# Patient Record
Sex: FEMALE | Race: BLACK OR AFRICAN AMERICAN | Employment: UNEMPLOYED | URBAN - METROPOLITAN AREA
[De-identification: names, ages, dates, MRNs, and addresses within clinical notes are randomized per-mention and may not be internally consistent; named-entity substitution may affect disease eponyms.]

---
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
## 2023-07-25 NOTE — PROGRESS NOTES
Elin Braga received a viral test for COVID-19. They were educated on isolation and quarantine as appropriate. For any symptoms, they were directed to seek care from their PCP, given contact information to establish with a doctor, directed to an urgent care or the emergency room. IMPRESSION      1.  Strep pharyngitis          DISPOSITION/PLAN   DISPOSITION Decision To Discharge 07/25/2023 06:28:16 PM      OUTPATIENT FOLLOW UP THE PATIENT:  Galilea Cantu Lior  [facility] [address]  [address] [facility]  [address] [facility] [address]  [facility], [address] [place] [address]  07/25/23 0184

## 2023-10-13 ENCOUNTER — OFFICE VISIT (OUTPATIENT)
Age: 23
End: 2023-10-13

## 2023-10-13 VITALS
HEIGHT: 67 IN | WEIGHT: 201.5 LBS | HEART RATE: 110 BPM | TEMPERATURE: 98.8 F | OXYGEN SATURATION: 98 % | SYSTOLIC BLOOD PRESSURE: 118 MMHG | RESPIRATION RATE: 18 BRPM | BODY MASS INDEX: 31.63 KG/M2 | DIASTOLIC BLOOD PRESSURE: 80 MMHG

## 2023-10-13 DIAGNOSIS — R11.0 NAUSEA: Primary | ICD-10-CM

## 2023-10-13 DIAGNOSIS — R10.9 ACUTE ABDOMINAL PAIN: ICD-10-CM

## 2023-10-13 LAB
APPEARANCE FLUID: CLEAR
BILIRUBIN, POC: NORMAL
BLOOD URINE, POC: NORMAL
CLARITY, POC: CLEAR
COLOR, POC: YELLOW
CONTROL: NORMAL
GLUCOSE URINE, POC: NEGATIVE
KETONES, POC: NORMAL
LEUKOCYTE EST, POC: NEGATIVE
NITRITE, POC: NEGATIVE
PH, POC: 5.5
PREGNANCY TEST URINE, POC: NEGATIVE
PROTEIN, POC: NEGATIVE
SPECIFIC GRAVITY, POC: 1.03
UROBILINOGEN, POC: NEGATIVE

## 2023-10-15 SDOH — HEALTH STABILITY: PHYSICAL HEALTH: ON AVERAGE, HOW MANY DAYS PER WEEK DO YOU ENGAGE IN MODERATE TO STRENUOUS EXERCISE (LIKE A BRISK WALK)?: 1 DAY

## 2023-10-15 SDOH — HEALTH STABILITY: PHYSICAL HEALTH: ON AVERAGE, HOW MANY MINUTES DO YOU ENGAGE IN EXERCISE AT THIS LEVEL?: 30 MIN

## 2023-10-16 ENCOUNTER — OFFICE VISIT (OUTPATIENT)
Dept: PRIMARY CARE CLINIC | Age: 23
End: 2023-10-16
Payer: COMMERCIAL

## 2023-10-16 VITALS
OXYGEN SATURATION: 98 % | WEIGHT: 198 LBS | SYSTOLIC BLOOD PRESSURE: 118 MMHG | HEIGHT: 67 IN | DIASTOLIC BLOOD PRESSURE: 70 MMHG | HEART RATE: 98 BPM | BODY MASS INDEX: 31.08 KG/M2

## 2023-10-16 DIAGNOSIS — R50.9 INTERMITTENT FEVER OF UNKNOWN ORIGIN: ICD-10-CM

## 2023-10-16 DIAGNOSIS — Z23 FLU VACCINE NEED: ICD-10-CM

## 2023-10-16 DIAGNOSIS — R76.8 ELEVATED RHEUMATOID FACTOR: Primary | ICD-10-CM

## 2023-10-16 DIAGNOSIS — Z13.21 ENCOUNTER FOR VITAMIN DEFICIENCY SCREENING: ICD-10-CM

## 2023-10-16 PROCEDURE — 99204 OFFICE O/P NEW MOD 45 MIN: CPT | Performed by: NURSE PRACTITIONER

## 2023-10-16 PROCEDURE — 90674 CCIIV4 VAC NO PRSV 0.5 ML IM: CPT | Performed by: NURSE PRACTITIONER

## 2023-10-16 PROCEDURE — G8427 DOCREV CUR MEDS BY ELIG CLIN: HCPCS | Performed by: NURSE PRACTITIONER

## 2023-10-16 PROCEDURE — G8417 CALC BMI ABV UP PARAM F/U: HCPCS | Performed by: NURSE PRACTITIONER

## 2023-10-16 PROCEDURE — 1036F TOBACCO NON-USER: CPT | Performed by: NURSE PRACTITIONER

## 2023-10-16 PROCEDURE — G8482 FLU IMMUNIZE ORDER/ADMIN: HCPCS | Performed by: NURSE PRACTITIONER

## 2023-10-16 PROCEDURE — 90471 IMMUNIZATION ADMIN: CPT | Performed by: NURSE PRACTITIONER

## 2023-10-16 SDOH — ECONOMIC STABILITY: FOOD INSECURITY: WITHIN THE PAST 12 MONTHS, YOU WORRIED THAT YOUR FOOD WOULD RUN OUT BEFORE YOU GOT MONEY TO BUY MORE.: NEVER TRUE

## 2023-10-16 SDOH — ECONOMIC STABILITY: FOOD INSECURITY: WITHIN THE PAST 12 MONTHS, THE FOOD YOU BOUGHT JUST DIDN'T LAST AND YOU DIDN'T HAVE MONEY TO GET MORE.: NEVER TRUE

## 2023-10-16 SDOH — ECONOMIC STABILITY: INCOME INSECURITY: HOW HARD IS IT FOR YOU TO PAY FOR THE VERY BASICS LIKE FOOD, HOUSING, MEDICAL CARE, AND HEATING?: NOT VERY HARD

## 2023-10-16 SDOH — ECONOMIC STABILITY: HOUSING INSECURITY
IN THE LAST 12 MONTHS, WAS THERE A TIME WHEN YOU DID NOT HAVE A STEADY PLACE TO SLEEP OR SLEPT IN A SHELTER (INCLUDING NOW)?: NO

## 2023-10-16 ASSESSMENT — PATIENT HEALTH QUESTIONNAIRE - PHQ9
SUM OF ALL RESPONSES TO PHQ QUESTIONS 1-9: 0
2. FEELING DOWN, DEPRESSED OR HOPELESS: 0
SUM OF ALL RESPONSES TO PHQ9 QUESTIONS 1 & 2: 0
SUM OF ALL RESPONSES TO PHQ QUESTIONS 1-9: 0
SUM OF ALL RESPONSES TO PHQ QUESTIONS 1-9: 0
1. LITTLE INTEREST OR PLEASURE IN DOING THINGS: 0
SUM OF ALL RESPONSES TO PHQ QUESTIONS 1-9: 0

## 2023-10-16 ASSESSMENT — ENCOUNTER SYMPTOMS
GASTROINTESTINAL NEGATIVE: 1
SHORTNESS OF BREATH: 0
CHEST TIGHTNESS: 0
COUGH: 0
APNEA: 0
WHEEZING: 0

## 2023-11-16 ENCOUNTER — TELEMEDICINE (OUTPATIENT)
Dept: PRIMARY CARE CLINIC | Age: 23
End: 2023-11-16

## 2023-11-16 DIAGNOSIS — J06.9 VIRAL UPPER RESPIRATORY INFECTION: ICD-10-CM

## 2023-11-16 DIAGNOSIS — Z20.822 SUSPECTED COVID-19 VIRUS INFECTION: ICD-10-CM

## 2023-11-16 DIAGNOSIS — J02.9 SORE THROAT: Primary | ICD-10-CM

## 2023-11-16 RX ORDER — IBUPROFEN 200 MG
2 TABLET ORAL EVERY 6 HOURS PRN
COMMUNITY

## 2023-11-16 RX ORDER — ACETAMINOPHEN 500 MG
1000 TABLET ORAL 3 TIMES DAILY PRN
COMMUNITY

## 2023-11-16 RX ORDER — BROMPHENIRAMINE MALEATE, PSEUDOEPHEDRINE HYDROCHLORIDE, AND DEXTROMETHORPHAN HYDROBROMIDE 2; 30; 10 MG/5ML; MG/5ML; MG/5ML
5 SYRUP ORAL 4 TIMES DAILY PRN
Qty: 200 ML | Refills: 0 | Status: SHIPPED | OUTPATIENT
Start: 2023-11-16 | End: 2023-11-26

## 2023-11-16 ASSESSMENT — ENCOUNTER SYMPTOMS
CHEST TIGHTNESS: 0
RHINORRHEA: 1
SORE THROAT: 1
SHORTNESS OF BREATH: 0
WHEEZING: 0
SINUS PAIN: 0
COUGH: 1
SINUS PRESSURE: 0

## 2023-11-16 NOTE — PROGRESS NOTES
ASSESSMENT/PLAN:  1. Sore throat  - Acute  - POCT rapid strep A    2. Viral upper respiratory infection  - Acute  - Start brompheniramine-pseudoephedrine-DM 2-30-10 MG/5ML syrup; Take 5 mLs by mouth 4 times daily as needed for Congestion or Cough  Dispense: 200 mL; Refill: 0  - Continue alternating Tylenol and Motrin for fever and headache. 3. Suspected COVID-19 virus infection  - COVID-19; Future      Return if symptoms worsen or fail to improve. RAPID STREP TEST & COVID TEST TODAY. .    Current Outpatient Medications   Medication Sig Dispense Refill    acetaminophen (TYLENOL) 500 MG tablet Take 2 tablets by mouth 3 times daily as needed for Fever or Headaches      ibuprofen (ADVIL;MOTRIN) 200 MG tablet Take 2 tablets by mouth every 6 hours as needed for Fever or Headaches      brompheniramine-pseudoephedrine-DM 2-30-10 MG/5ML syrup Take 5 mLs by mouth 4 times daily as needed for Congestion or Cough 200 mL 0     No current facility-administered medications for this visit. Moshe Avilez, was evaluated through a synchronous (real-time) audio-video encounter. The patient (or guardian if applicable) is aware that this is a billable service, which includes applicable co-pays. This Virtual Visit was conducted with patient's (and/or legal guardian's) consent. Patient identification was verified, and a caregiver was present when appropriate. The patient was located at Home: [address] [place] [address]  Provider was located at [facility] ([address] ): [address] [place] [address],  [address] [place] [address]       Total time spent for this encounter:  15 minutes    --MAIK Huynh CNP on 11/16/2023 at 11:20 AM    An electronic signature was used to authenticate this note. This dictation was generated by voice recognition computer software. Although all attempts are made to edit the dictation for accuracy, there may be errors in the transcription that are not intended.

## 2024-01-31 ENCOUNTER — TELEPHONE (OUTPATIENT)
Dept: PRIMARY CARE CLINIC | Age: 24
End: 2024-01-31

## 2024-02-01 ENCOUNTER — TELEMEDICINE (OUTPATIENT)
Dept: PRIMARY CARE CLINIC | Age: 24
End: 2024-02-01
Payer: COMMERCIAL

## 2024-02-01 DIAGNOSIS — U07.1 COVID-19 VIRUS INFECTION: Primary | ICD-10-CM

## 2024-02-01 DIAGNOSIS — R11.0 NAUSEA: ICD-10-CM

## 2024-02-01 PROCEDURE — G8427 DOCREV CUR MEDS BY ELIG CLIN: HCPCS | Performed by: NURSE PRACTITIONER

## 2024-02-01 PROCEDURE — 99214 OFFICE O/P EST MOD 30 MIN: CPT | Performed by: NURSE PRACTITIONER

## 2024-02-01 RX ORDER — AZELASTINE HYDROCHLORIDE 137 UG/1
2 SPRAY, METERED NASAL DAILY
COMMUNITY
Start: 2023-10-09

## 2024-02-01 RX ORDER — ONDANSETRON 4 MG/1
4 TABLET, FILM COATED ORAL 3 TIMES DAILY PRN
Qty: 15 TABLET | Refills: 0 | Status: SHIPPED | OUTPATIENT
Start: 2024-02-01

## 2024-02-01 RX ORDER — ACETAMINOPHEN, GUAIFENESIN 325; 200 MG/1; MG/1
2 CAPSULE, LIQUID FILLED ORAL EVERY 6 HOURS PRN
COMMUNITY

## 2024-02-01 ASSESSMENT — ENCOUNTER SYMPTOMS
SORE THROAT: 1
VOMITING: 0
NAUSEA: 1
SINUS PAIN: 0
TROUBLE SWALLOWING: 0
RHINORRHEA: 1
SHORTNESS OF BREATH: 0
WHEEZING: 0
SINUS PRESSURE: 0
CHEST TIGHTNESS: 0
COUGH: 1
DIARRHEA: 0
APNEA: 0

## 2024-02-01 ASSESSMENT — PATIENT HEALTH QUESTIONNAIRE - PHQ9
SUM OF ALL RESPONSES TO PHQ QUESTIONS 1-9: 0
SUM OF ALL RESPONSES TO PHQ9 QUESTIONS 1 & 2: 0
2. FEELING DOWN, DEPRESSED OR HOPELESS: 0
SUM OF ALL RESPONSES TO PHQ QUESTIONS 1-9: 0
1. LITTLE INTEREST OR PLEASURE IN DOING THINGS: 0
SUM OF ALL RESPONSES TO PHQ QUESTIONS 1-9: 0
SUM OF ALL RESPONSES TO PHQ QUESTIONS 1-9: 0

## 2024-02-01 NOTE — PATIENT INSTRUCTIONS
During your illness stay well hydrated with water, Pedialyte, or sugar-free Gatorade. Take Tylenol for body aches, fever, headache as needed. Seek emergency medical care immediately for trouble breathing, persistent pain or pressure in the chest, new confusion, inability to wake or stay awake, or bluish lips or face.      Stay home at least 5 days since symptoms first appeared AND at least 24 hours with no fever without the use of fever-reducing medications AND symptoms have improved.    Please let me know if you have any questions.   Hope you feel better soon!  Rapp

## 2024-02-01 NOTE — PROGRESS NOTES
2024    TELEHEALTH EVALUATION     Chief Complaint   Patient presents with    Illness     Pt tested positive on , 24  for Covid 19        HPI:    Rose Morgan (: 2000) has requested an audio/video evaluation for the following concern(s): COVID-19    Pt tested positive for Covid 19 four days ago.   Reported symptoms: Headaches, nausea, sore throat, nasal congestion, and mild chest pain. Chest pain is constant, however increases or worsens when patient coughs or wakes up from a nap.   Denies history of strep.  Denies difficulty breathing, shortness of breath, wheezing. (Wheezing resolved).     Treatments tried: Azelastine nasal spray and Mucinex cold and flu. Reports symptoms improve with medications.     Received COVID-19 vaccines  x3    Review of Systems   Constitutional:  Positive for appetite change and fatigue. Negative for activity change, chills, diaphoresis, fever and unexpected weight change.   HENT:  Positive for congestion, rhinorrhea and sore throat. Negative for postnasal drip, sinus pressure, sinus pain, sneezing and trouble swallowing.    Respiratory:  Positive for cough. Negative for apnea, chest tightness, shortness of breath and wheezing.    Cardiovascular:  Negative for chest pain, palpitations and leg swelling.   Gastrointestinal:  Positive for nausea. Negative for diarrhea and vomiting.   Musculoskeletal: Negative.    Neurological:  Positive for headaches. Negative for dizziness, weakness and light-headedness.   Psychiatric/Behavioral: Negative.          Current Outpatient Medications on File Prior to Visit   Medication Sig Dispense Refill    Acetaminophen-guaiFENesin (MUCINEX COLD & FLU) 325-200 MG CAPS Take 2 capsules by mouth every 6 hours as needed (nasal congestion)      Azelastine HCl 137 MCG/SPRAY SOLN 2 sprays by Nasal route daily      acetaminophen (TYLENOL) 500 MG tablet Take 2 tablets by mouth 3 times daily as needed for Fever or Headaches      ibuprofen

## 2024-09-10 ENCOUNTER — PATIENT MESSAGE (OUTPATIENT)
Dept: PRIMARY CARE CLINIC | Age: 24
End: 2024-09-10

## 2024-09-10 ENCOUNTER — TELEPHONE (OUTPATIENT)
Dept: PRIMARY CARE CLINIC | Age: 24
End: 2024-09-10

## 2024-09-10 ENCOUNTER — TELEMEDICINE (OUTPATIENT)
Dept: PRIMARY CARE CLINIC | Age: 24
End: 2024-09-10
Payer: COMMERCIAL

## 2024-09-10 DIAGNOSIS — J06.9 VIRAL URI: Primary | ICD-10-CM

## 2024-09-10 PROCEDURE — G8427 DOCREV CUR MEDS BY ELIG CLIN: HCPCS | Performed by: STUDENT IN AN ORGANIZED HEALTH CARE EDUCATION/TRAINING PROGRAM

## 2024-09-10 PROCEDURE — G8417 CALC BMI ABV UP PARAM F/U: HCPCS | Performed by: STUDENT IN AN ORGANIZED HEALTH CARE EDUCATION/TRAINING PROGRAM

## 2024-09-10 PROCEDURE — 1036F TOBACCO NON-USER: CPT | Performed by: STUDENT IN AN ORGANIZED HEALTH CARE EDUCATION/TRAINING PROGRAM

## 2024-09-10 PROCEDURE — 99213 OFFICE O/P EST LOW 20 MIN: CPT | Performed by: STUDENT IN AN ORGANIZED HEALTH CARE EDUCATION/TRAINING PROGRAM

## 2024-09-10 RX ORDER — FLUTICASONE PROPIONATE 50 MCG
2 SPRAY, SUSPENSION (ML) NASAL DAILY
Qty: 16 G | Refills: 0 | Status: SHIPPED | OUTPATIENT
Start: 2024-09-10

## 2024-09-10 ASSESSMENT — ENCOUNTER SYMPTOMS
ABDOMINAL PAIN: 0
NAUSEA: 0
DIARRHEA: 0
RHINORRHEA: 1
SORE THROAT: 1
VOMITING: 0
SINUS PRESSURE: 1
SHORTNESS OF BREATH: 0
COUGH: 1

## 2024-11-20 ENCOUNTER — OFFICE VISIT (OUTPATIENT)
Age: 24
End: 2024-11-20

## 2024-11-20 VITALS
SYSTOLIC BLOOD PRESSURE: 126 MMHG | HEIGHT: 67 IN | TEMPERATURE: 98.3 F | HEART RATE: 115 BPM | OXYGEN SATURATION: 97 % | WEIGHT: 202.8 LBS | BODY MASS INDEX: 31.83 KG/M2 | DIASTOLIC BLOOD PRESSURE: 81 MMHG

## 2024-11-20 DIAGNOSIS — J01.00 ACUTE MAXILLARY SINUSITIS, RECURRENCE NOT SPECIFIED: Primary | ICD-10-CM

## 2024-11-20 DIAGNOSIS — J02.9 SORE THROAT: ICD-10-CM

## 2024-11-20 LAB — S PYO AG THROAT QL: NORMAL

## 2024-11-20 RX ORDER — FLUTICASONE PROPIONATE 50 MCG
1 SPRAY, SUSPENSION (ML) NASAL DAILY
Qty: 16 G | Refills: 0 | Status: SHIPPED | OUTPATIENT
Start: 2024-11-20

## 2024-11-20 RX ORDER — ASPIRIN 81 MG/1
81 TABLET, CHEWABLE ORAL DAILY
COMMUNITY

## 2024-11-20 RX ORDER — AMOXICILLIN 500 MG/1
500 CAPSULE ORAL 3 TIMES DAILY
Qty: 21 CAPSULE | Refills: 0 | Status: SHIPPED | OUTPATIENT
Start: 2024-11-20 | End: 2024-11-27

## 2024-11-20 RX ORDER — AZITHROMYCIN 250 MG/1
TABLET, FILM COATED ORAL
Qty: 6 TABLET | Refills: 0 | Status: SHIPPED | OUTPATIENT
Start: 2024-11-20 | End: 2024-11-20 | Stop reason: SDUPTHER

## 2024-11-20 ASSESSMENT — ENCOUNTER SYMPTOMS
SORE THROAT: 1
SINUS PRESSURE: 1

## 2024-11-20 NOTE — PROGRESS NOTES
Rose Morgan (:  2000) is a 24 y.o. female,Established patient, here for evaluation of the following chief complaint(s):  Cold Symptoms (Sore throat, congestion, sinus pressure, chest pain and cough for four days)      ASSESSMENT/PLAN:  1. Sore throat  - POCT rapid strep A NEGATIVE    2. Acute maxillary sinusitis, recurrence not specified  - azithromycin (ZITHROMAX) 250 MG tablet; 500mg on day 1 followed by 250mg on days 2 - 5  Dispense: 6 tablet; Refill: 0  - fluticasone (FLONASE) 50 MCG/ACT nasal spray; 1 spray by Each Nostril route daily  Dispense: 16 g; Refill: 0   -TIME SPENT WITH PATIENT 34 MINUTES.    Return if symptoms worsen or fail to improve.    SUBJECTIVE/OBJECTIVE:  PRESENT TO CLINIC WITH THROAT HURT,CONGESTION AND SINUS PRESSURE FOR 4 DAYS. NO FEVER. NO SOB. NO SICK CONTACT AT HOME.      History provided by:  Patient  Cold Symptoms   Associated symptoms include congestion and a sore throat.       Vitals:    24 0852   BP: 126/81   Site: Right Upper Arm   Position: Sitting   Cuff Size: Medium Adult   Pulse: (!) 115   Temp: 98.3 °F (36.8 °C)   TempSrc: Oral   SpO2: 97%   Weight: 92 kg (202 lb 12.8 oz)   Height: 1.702 m (5' 7\")       Review of Systems   HENT:  Positive for congestion, sinus pressure and sore throat.        Physical Exam  Constitutional:       Appearance: Normal appearance.   HENT:      Head: Normocephalic and atraumatic.      Nose: Congestion present.      Mouth/Throat:      Pharynx: Posterior oropharyngeal erythema present.   Eyes:      Pupils: Pupils are equal, round, and reactive to light.   Pulmonary:      Effort: Pulmonary effort is normal.      Breath sounds: Normal breath sounds.   Musculoskeletal:         General: Normal range of motion.      Cervical back: Normal range of motion and neck supple.   Skin:     General: Skin is warm.   Neurological:      Mental Status: She is alert and oriented to person, place, and time.   Psychiatric:         Mood and Affect: Mood

## 2024-11-22 ENCOUNTER — TELEPHONE (OUTPATIENT)
Dept: PRIMARY CARE CLINIC | Age: 24
End: 2024-11-22